# Patient Record
Sex: MALE | Race: WHITE | URBAN - METROPOLITAN AREA
[De-identification: names, ages, dates, MRNs, and addresses within clinical notes are randomized per-mention and may not be internally consistent; named-entity substitution may affect disease eponyms.]

---
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
## 2023-01-04 DIAGNOSIS — Z13.220 SCREENING FOR HYPERLIPIDEMIA: ICD-10-CM

## 2023-01-04 LAB
CHOLESTEROL, TOTAL: 154 MG/DL (ref 0–199)
HDLC SERPL-MCNC: 46 MG/DL (ref 40–60)
LDL CHOLESTEROL CALCULATED: 88 MG/DL
TRIGL SERPL-MCNC: 100 MG/DL (ref 0–150)
VLDLC SERPL CALC-MCNC: 20 MG/DL

## 2023-01-05 NOTE — RESULT ENCOUNTER NOTE
Please call with the following message: Good Morning Satinderrenae Osei ,    Thank you for getting your blood work drawn. Your results are back and your cholesterol looks great.       I look forward to seeing you in a few days,    Dr. Binnie Fothergill      For your convenience I have listed your future appointment(s) below:  Future Appointments  1/9/2023   10:30 AM   MD SANDI Chahal

## 2023-01-09 ENCOUNTER — TELEPHONE (OUTPATIENT)
Dept: PRIMARY CARE CLINIC | Age: 18
End: 2023-01-09

## 2023-01-09 ENCOUNTER — OFFICE VISIT (OUTPATIENT)
Dept: PRIMARY CARE CLINIC | Age: 18
End: 2023-01-09
Payer: COMMERCIAL

## 2023-01-09 VITALS
WEIGHT: 123 LBS | HEIGHT: 67 IN | OXYGEN SATURATION: 98 % | DIASTOLIC BLOOD PRESSURE: 64 MMHG | TEMPERATURE: 98.1 F | BODY MASS INDEX: 19.3 KG/M2 | SYSTOLIC BLOOD PRESSURE: 113 MMHG | HEART RATE: 84 BPM

## 2023-01-09 DIAGNOSIS — F33.1 MODERATE EPISODE OF RECURRENT MAJOR DEPRESSIVE DISORDER (HCC): ICD-10-CM

## 2023-01-09 DIAGNOSIS — F41.9 ANXIETY: ICD-10-CM

## 2023-01-09 DIAGNOSIS — F33.1 MODERATE EPISODE OF RECURRENT MAJOR DEPRESSIVE DISORDER (HCC): Primary | ICD-10-CM

## 2023-01-09 DIAGNOSIS — L70.0 ACNE VULGARIS: ICD-10-CM

## 2023-01-09 DIAGNOSIS — F41.9 ANXIETY: Primary | ICD-10-CM

## 2023-01-09 PROCEDURE — 99214 OFFICE O/P EST MOD 30 MIN: CPT | Performed by: FAMILY MEDICINE

## 2023-01-09 PROCEDURE — 96127 BRIEF EMOTIONAL/BEHAV ASSMT: CPT | Performed by: FAMILY MEDICINE

## 2023-01-09 RX ORDER — DULOXETIN HYDROCHLORIDE 30 MG/1
90 CAPSULE, DELAYED RELEASE ORAL DAILY
Qty: 90 CAPSULE | Refills: 5 | Status: SHIPPED | OUTPATIENT
Start: 2023-01-09

## 2023-01-09 RX ORDER — VENLAFAXINE HYDROCHLORIDE 150 MG/1
150 CAPSULE, EXTENDED RELEASE ORAL DAILY
Qty: 30 CAPSULE | Refills: 3 | Status: SHIPPED | OUTPATIENT
Start: 2023-01-09 | End: 2023-01-09

## 2023-01-09 ASSESSMENT — ANXIETY QUESTIONNAIRES
1. FEELING NERVOUS, ANXIOUS, OR ON EDGE: 1
4. TROUBLE RELAXING: 1
GAD7 TOTAL SCORE: 5
6. BECOMING EASILY ANNOYED OR IRRITABLE: 2
3. WORRYING TOO MUCH ABOUT DIFFERENT THINGS: 0
2. NOT BEING ABLE TO STOP OR CONTROL WORRYING: 0
7. FEELING AFRAID AS IF SOMETHING AWFUL MIGHT HAPPEN: 0
IF YOU CHECKED OFF ANY PROBLEMS ON THIS QUESTIONNAIRE, HOW DIFFICULT HAVE THESE PROBLEMS MADE IT FOR YOU TO DO YOUR WORK, TAKE CARE OF THINGS AT HOME, OR GET ALONG WITH OTHER PEOPLE: SOMEWHAT DIFFICULT
5. BEING SO RESTLESS THAT IT IS HARD TO SIT STILL: 1

## 2023-01-09 ASSESSMENT — PATIENT HEALTH QUESTIONNAIRE - PHQ9
3. TROUBLE FALLING OR STAYING ASLEEP: 2
SUM OF ALL RESPONSES TO PHQ QUESTIONS 1-9: 10
9. THOUGHTS THAT YOU WOULD BE BETTER OFF DEAD, OR OF HURTING YOURSELF: 0
6. FEELING BAD ABOUT YOURSELF - OR THAT YOU ARE A FAILURE OR HAVE LET YOURSELF OR YOUR FAMILY DOWN: 0
8. MOVING OR SPEAKING SO SLOWLY THAT OTHER PEOPLE COULD HAVE NOTICED. OR THE OPPOSITE, BEING SO FIGETY OR RESTLESS THAT YOU HAVE BEEN MOVING AROUND A LOT MORE THAN USUAL: 1
SUM OF ALL RESPONSES TO PHQ QUESTIONS 1-9: 10
2. FEELING DOWN, DEPRESSED OR HOPELESS: 1
4. FEELING TIRED OR HAVING LITTLE ENERGY: 2
SUM OF ALL RESPONSES TO PHQ9 QUESTIONS 1 & 2: 2
5. POOR APPETITE OR OVEREATING: 2
10. IF YOU CHECKED OFF ANY PROBLEMS, HOW DIFFICULT HAVE THESE PROBLEMS MADE IT FOR YOU TO DO YOUR WORK, TAKE CARE OF THINGS AT HOME, OR GET ALONG WITH OTHER PEOPLE: NOT DIFFICULT AT ALL
SUM OF ALL RESPONSES TO PHQ QUESTIONS 1-9: 10
7. TROUBLE CONCENTRATING ON THINGS, SUCH AS READING THE NEWSPAPER OR WATCHING TELEVISION: 1
SUM OF ALL RESPONSES TO PHQ QUESTIONS 1-9: 10
1. LITTLE INTEREST OR PLEASURE IN DOING THINGS: 1

## 2023-01-09 ASSESSMENT — PATIENT HEALTH QUESTIONNAIRE - GENERAL
IN THE PAST YEAR HAVE YOU FELT DEPRESSED OR SAD MOST DAYS, EVEN IF YOU FELT OKAY SOMETIMES?: NO
HAS THERE BEEN A TIME IN THE PAST MONTH WHEN YOU HAVE HAD SERIOUS THOUGHTS ABOUT ENDING YOUR LIFE?: NO
HAVE YOU EVER, IN YOUR WHOLE LIFE, TRIED TO KILL YOURSELF OR MADE A SUICIDE ATTEMPT?: NO

## 2023-01-09 NOTE — PROGRESS NOTES
Chief Complaint   Patient presents with    Anxiety    Depression       HPI: MAURI  presents for evaluation and management of anxiety and depression. He notes that his mood and anxiety are fairly well controlled but he has been more irritable lately and is his interfered with his interactions with family. 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 He just went back to school and he thinks that may be contributing to it as well. He has a history of acne primarily on his back and had been taking doxycycline for this but is not on it currently. RAMA 7 SCORE 1/9/2023 7/29/2022   RAMA-7 Total Score 5 7     Interpretation of RAMA-7 score: 5-9 = mild anxiety, 10-14 = moderate anxiety, 15+ = severe anxiety. Recommend referral to behavioral health for scores 10 or greater. Today's PHQ:    PHQ Scores 1/9/2023 7/29/2022 7/29/2022   PHQ2 Score 2 0 0   PHQ9 Score 10 6 0     Interpretation of Total Score Depression Severity: 1-4 = Minimal depression, 5-9 = Mild depression, 10-14 = Moderate depression, 15-19 = Moderately severe depression, 20-27 = Severe depression        Review of Systems    No Known Allergies  New Prescriptions    VENLAFAXINE (EFFEXOR XR) 150 MG EXTENDED RELEASE CAPSULE    Take 1 capsule by mouth daily     Current Outpatient Medications   Medication Sig Dispense Refill    venlafaxine (EFFEXOR XR) 150 MG extended release capsule Take 1 capsule by mouth daily 30 capsule 3     No current facility-administered medications for this visit.        Past Medical History:   Diagnosis Date    Anxiety     Moderate episode of recurrent major depressive disorder (HCC)          Objective   /64   Pulse 84   Temp 98.1 °F (36.7 °C)   Ht 5' 7\" (1.702 m)   Wt 123 lb (55.8 kg)   SpO2 98%   BMI 19.26 kg/m²   Wt Readings from Last 3 Encounters:   01/09/23 123 lb (55.8 kg) (6 %, Z= -1.57)*   07/29/22 129 lb (58.5 kg) (13 %, Z= -1.14)*     * Growth percentiles are based on CDC (Boys, 2-20 Years) data. Physical Exam  Constitutional:       Appearance: He is well-developed. Cardiovascular:      Rate and Rhythm: Normal rate and regular rhythm. Pulses:           Dorsalis pedis pulses are 2+ on the right side and 2+ on the left side. Posterior tibial pulses are 2+ on the right side and 2+ on the left side. Heart sounds: No murmur heard. No friction rub. No gallop. Comments: No Lower Extremity Edema  Pulmonary:      Effort: Pulmonary effort is normal.      Breath sounds: Normal breath sounds. No wheezing or rales. Abdominal:      General: Bowel sounds are normal. There is no distension. Palpations: Abdomen is soft. There is no mass. Tenderness: There is no abdominal tenderness. Skin:     General: Skin is warm and dry. Findings: No rash. Comments: Mild cystic acne of back. Chemistry    No results found for: NA, K, CL, CO2, BUN, CREATININE, GLU No results found for: CALCIUM, ALKPHOS, AST, ALT, BILITOT       No results found for: WBC, HGB, HCT, MCV, PLT  No results found for: LABA1C  No results found for: EAG  No results found for: LABA1C  No components found for: CHLPL  Lab Results   Component Value Date    TRIG 100 01/04/2023     Lab Results   Component Value Date    HDL 46 01/04/2023     Lab Results   Component Value Date    LDLCALC 88 01/04/2023     Lab Results   Component Value Date    LABVLDL 20 01/04/2023         Assessment   Plan   1. Anxiety  Fairly well controlled: We will switch patient from Cymbalta to Effexor and follow-up in 6 weeks. Referred to Dr. Sherine Burorughs for some counseling and cognitive behavioral therapy  - [place] BEHAV ASSMT W/SCORE & DOCD/STAND INSTRUMENT  - venlafaxine (EFFEXOR XR) 150 MG extended release capsule; Take 1 capsule by mouth daily  Dispense: 30 capsule;  Refill: 3  - Ambulatory referral to Psychology  Addendum: Patient's mother called and let me know that he had [employer] testing and Effexor was not one of the preferred medications. She does not have the records to back this up but she is doing this for memory and would prefer we titrate up his Cymbalta. Consequently we will increase his Cymbalta to 90 mg and follow-up in 6 weeks. 2. Moderate episode of recurrent major depressive disorder (HCC)  Marginal control: We will switch from Cymbalta to Effexor and follow-up in 6 weeks. Patient referred to Dr. Adrienne Cuellar for cognitive behavioral therapy as well  - [place] BEHAV ASSMT W/SCORE & DOCD/STAND INSTRUMENT  - venlafaxine (EFFEXOR XR) 150 MG extended release capsule; Take 1 capsule by mouth daily  Dispense: 30 capsule; Refill: 3  - Ambulatory referral to Psychology    3. Acne vulgaris  Counseled patient I do not think his acne rises to the level of taking doxycycline. The benefits are outweighed by the risks of the medication. Gave him some tips for mild exfoliate and follow-up in 6-week        RTC Return in about 6 weeks (around 2/20/2023).

## 2023-01-09 NOTE — TELEPHONE ENCOUNTER
Pt took a gene test and only 2 medication came back that worked for the pt.    Mother just wanted to make sure Dr. Larry Hardin knows and that this is one of the approved medications

## 2023-01-19 DIAGNOSIS — F33.1 MODERATE EPISODE OF RECURRENT MAJOR DEPRESSIVE DISORDER (HCC): ICD-10-CM

## 2023-01-19 DIAGNOSIS — F41.9 ANXIETY: ICD-10-CM

## 2023-01-24 DIAGNOSIS — F33.1 MODERATE EPISODE OF RECURRENT MAJOR DEPRESSIVE DISORDER (HCC): ICD-10-CM

## 2023-01-24 DIAGNOSIS — F41.9 ANXIETY: ICD-10-CM

## 2023-01-24 RX ORDER — VENLAFAXINE HYDROCHLORIDE 150 MG/1
150 CAPSULE, EXTENDED RELEASE ORAL DAILY
Qty: 30 CAPSULE | Refills: 3 | Status: SHIPPED | OUTPATIENT
Start: 2023-01-24

## 2023-01-24 NOTE — PROGRESS NOTES
Medication:   Requested Prescriptions     Pending Prescriptions Disp Refills    venlafaxine (EFFEXOR XR) 150 MG extended release capsule 30 capsule 3     Sig: Take 1 capsule by mouth daily        Last Filled:  01/09/2023    Patient Phone Number: [phone] (home)     Last appt: 1/9/2023 Return in about 6 weeks (around 2/20/2023). Next appt: Visit date not found    Last OARRS: No flowsheet data found.

## 2023-08-22 DIAGNOSIS — F33.1 MODERATE EPISODE OF RECURRENT MAJOR DEPRESSIVE DISORDER (HCC): ICD-10-CM

## 2023-08-22 DIAGNOSIS — F41.9 ANXIETY: ICD-10-CM

## 2023-08-22 RX ORDER — DULOXETIN HYDROCHLORIDE 30 MG/1
90 CAPSULE, DELAYED RELEASE ORAL DAILY
Qty: 90 CAPSULE | Refills: 5 | OUTPATIENT
Start: 2023-08-22

## 2023-08-24 ENCOUNTER — TELEPHONE (OUTPATIENT)
Dept: PRIMARY CARE CLINIC | Age: 18
End: 2023-08-24

## 2023-08-24 NOTE — TELEPHONE ENCOUNTER
LVM for pt to call back to see if he is staying with our Primary [facility]. Also offer an appt to get a refill.